# Patient Record
Sex: FEMALE | Race: WHITE
[De-identification: names, ages, dates, MRNs, and addresses within clinical notes are randomized per-mention and may not be internally consistent; named-entity substitution may affect disease eponyms.]

---

## 2018-02-09 ENCOUNTER — HOSPITAL ENCOUNTER (INPATIENT)
Dept: HOSPITAL 91 - ICU | Age: 83
Discharge: LEFT BEFORE DISCHARGE | DRG: 69 | End: 2018-02-09
Payer: MEDICARE

## 2018-02-09 ENCOUNTER — HOSPITAL ENCOUNTER (INPATIENT)
Age: 83
Discharge: LEFT BEFORE DISCHARGE | DRG: 69 | End: 2018-02-09

## 2018-02-09 DIAGNOSIS — Z86.73: ICD-10-CM

## 2018-02-09 DIAGNOSIS — R47.01: ICD-10-CM

## 2018-02-09 DIAGNOSIS — D64.9: ICD-10-CM

## 2018-02-09 DIAGNOSIS — G45.9: Primary | ICD-10-CM

## 2018-02-09 DIAGNOSIS — R29.810: ICD-10-CM

## 2018-02-09 DIAGNOSIS — I16.0: ICD-10-CM

## 2018-02-09 DIAGNOSIS — E11.649: ICD-10-CM

## 2018-02-09 LAB
ADD MAN DIFF?: NO
ADD UMIC: YES
ALANINE AMINOTRANSFERASE: 34 IU/L (ref 13–69)
ALBUMIN/GLOBULIN RATIO: 1.34
ALBUMIN: 4.3 G/DL (ref 3.3–4.9)
ALKALINE PHOSPHATASE: 63 IU/L (ref 42–121)
ANION GAP: 17 (ref 8–16)
ASPARTATE AMINO TRANSFERASE: 31 IU/L (ref 15–46)
BASOPHIL #: 0.1 10^3/UL (ref 0–0.1)
BASOPHILS %: 0.7 % (ref 0–2)
BILIRUBIN,DIRECT: 0 MG/DL (ref 0–0.2)
BILIRUBIN,TOTAL: 0 MG/DL (ref 0.2–1.3)
BLOOD UREA NITROGEN: 19 MG/DL (ref 7–20)
CALCIUM: 9 MG/DL (ref 8.4–10.2)
CARBON DIOXIDE: 26 MMOL/L (ref 21–31)
CHLORIDE: 106 MMOL/L (ref 97–110)
CREATININE: 1.08 MG/DL (ref 0.44–1)
EOSINOPHILS #: 0.1 10^3/UL (ref 0–0.5)
EOSINOPHILS %: 1 % (ref 0–7)
GLOBULIN: 3.2 G/DL (ref 1.3–3.2)
GLUCOSE: 168 MG/DL (ref 70–220)
HEMATOCRIT: 35.2 % (ref 37–47)
HEMOGLOBIN: 10.7 G/DL (ref 12–16)
INR: 0.93
LACTIC ACID: 1.8 MMOL/L (ref 0.5–2)
LACTIC ACID: 2.9 MMOL/L (ref 0.5–2)
LYMPHOCYTES #: 1.6 10^3/UL (ref 0.8–2.9)
LYMPHOCYTES %: 23.6 % (ref 15–51)
MEAN CORPUSCULAR HEMOGLOBIN: 26.8 PG (ref 29–33)
MEAN CORPUSCULAR HGB CONC: 30.4 G/DL (ref 32–37)
MEAN CORPUSCULAR VOLUME: 88.2 FL (ref 82–101)
MEAN PLATELET VOLUME: 10.5 FL (ref 7.4–10.4)
MONOCYTE #: 0.5 10^3/UL (ref 0.3–0.9)
MONOCYTES %: 6.7 % (ref 0–11)
NEUTROPHIL #: 4.6 10^3/UL (ref 1.6–7.5)
NEUTROPHILS %: 67.6 % (ref 39–77)
NUCLEATED RED BLOOD CELLS #: 0 10^3/UL (ref 0–0)
NUCLEATED RED BLOOD CELLS%: 0 /100WBC (ref 0–0)
PARTIAL THROMBOPLASTIN TIME: 26.9 SEC (ref 25–35)
PLATELET COUNT: 153 10^3/UL (ref 140–415)
POTASSIUM: 4.2 MMOL/L (ref 3.5–5.1)
PROTIME: 12.5 SEC (ref 11.9–14.9)
PT RATIO: 1
RED BLOOD COUNT: 3.99 10^6/UL (ref 4.2–5.4)
RED CELL DISTRIBUTION WIDTH: 14.5 % (ref 11.5–14.5)
SODIUM: 145 MMOL/L (ref 135–144)
TOTAL PROTEIN: 7.5 G/DL (ref 6.1–8.1)
TROPONIN-I: 0.01 NG/ML (ref 0–0.12)
UR AMORPHOUS CRYSTAL: (no result) /HPF
UR ASCORBIC ACID: NEGATIVE MG/DL
UR BACTERIA: (no result) /HPF
UR BILIRUBIN (DIP): NEGATIVE MG/DL
UR BLOOD (DIP): NEGATIVE MG/DL
UR CLARITY: CLEAR
UR COLOR: YELLOW
UR GLUCOSE (DIP): (no result) MG/DL
UR KETONES (DIP): NEGATIVE MG/DL
UR LEUKOCYTE ESTERASE (DIP): NEGATIVE LEU/UL
UR NITRITE (DIP): NEGATIVE MG/DL
UR PH (DIP): 5 (ref 5–9)
UR RBC: 0 /HPF (ref 0–5)
UR SPECIFIC GRAVITY (DIP): 1.01 (ref 1–1.03)
UR TOTAL PROTEIN (DIP): (no result) MG/DL
UR UROBILINOGEN (DIP): NEGATIVE MG/DL
UR WBC: 1 /HPF (ref 0–5)
URINE PH (DIP) POC: 5.5 (ref 5–8.5)
URINE TOTAL PROTEIN POC: (no result)
WHITE BLOOD COUNT: 6.8 10^3/UL (ref 4.8–10.8)

## 2018-02-09 PROCEDURE — 80053 COMPREHEN METABOLIC PANEL: CPT

## 2018-02-09 PROCEDURE — 93306 TTE W/DOPPLER COMPLETE: CPT

## 2018-02-09 PROCEDURE — 96374 THER/PROPH/DIAG INJ IV PUSH: CPT

## 2018-02-09 PROCEDURE — 84484 ASSAY OF TROPONIN QUANT: CPT

## 2018-02-09 PROCEDURE — 93005 ELECTROCARDIOGRAM TRACING: CPT

## 2018-02-09 PROCEDURE — 99285 EMERGENCY DEPT VISIT HI MDM: CPT

## 2018-02-09 PROCEDURE — 85610 PROTHROMBIN TIME: CPT

## 2018-02-09 PROCEDURE — 81001 URINALYSIS AUTO W/SCOPE: CPT

## 2018-02-09 PROCEDURE — 87086 URINE CULTURE/COLONY COUNT: CPT

## 2018-02-09 PROCEDURE — 83605 ASSAY OF LACTIC ACID: CPT

## 2018-02-09 PROCEDURE — 85025 COMPLETE CBC W/AUTO DIFF WBC: CPT

## 2018-02-09 PROCEDURE — 81003 URINALYSIS AUTO W/O SCOPE: CPT

## 2018-02-09 PROCEDURE — 87040 BLOOD CULTURE FOR BACTERIA: CPT

## 2018-02-09 PROCEDURE — 36415 COLL VENOUS BLD VENIPUNCTURE: CPT

## 2018-02-09 PROCEDURE — 85730 THROMBOPLASTIN TIME PARTIAL: CPT

## 2018-02-09 PROCEDURE — 96375 TX/PRO/DX INJ NEW DRUG ADDON: CPT

## 2018-02-09 PROCEDURE — 70450 CT HEAD/BRAIN W/O DYE: CPT

## 2018-02-09 PROCEDURE — 71045 X-RAY EXAM CHEST 1 VIEW: CPT

## 2018-02-09 PROCEDURE — 82962 GLUCOSE BLOOD TEST: CPT

## 2018-02-09 RX ADMIN — LABETALOL HYDROCHLORIDE 1 MG: 5 INJECTION, SOLUTION INTRAVENOUS at 13:56

## 2018-02-09 RX ADMIN — ASPIRIN 1 MG: 300 SUPPOSITORY RECTAL at 06:10

## 2018-02-09 RX ADMIN — ONDANSETRON HYDROCHLORIDE 1 MG: 2 INJECTION, SOLUTION INTRAMUSCULAR; INTRAVENOUS at 08:12

## 2018-02-09 RX ADMIN — LORAZEPAM 1 MG: 2 INJECTION, SOLUTION INTRAMUSCULAR; INTRAVENOUS at 13:55

## 2018-02-09 RX ADMIN — INSULIN ASPART 1 UNIT: 100 INJECTION, SOLUTION INTRAVENOUS; SUBCUTANEOUS at 13:00

## 2018-02-09 RX ADMIN — INSULIN ASPART 1 UNIT: 100 INJECTION, SOLUTION INTRAVENOUS; SUBCUTANEOUS at 09:00

## 2018-02-09 RX ADMIN — PANTOPRAZOLE SODIUM 1 MG: 40 INJECTION, POWDER, FOR SOLUTION INTRAVENOUS at 07:11

## 2018-02-09 RX ADMIN — THIAMINE HYDROCHLORIDE 1 MLS/HR: 100 INJECTION, SOLUTION INTRAMUSCULAR; INTRAVENOUS at 07:11

## 2018-02-09 RX ADMIN — DEXTROSE MONOHYDRATE 1 ML: 500 INJECTION PARENTERAL at 05:15

## 2018-02-09 RX ADMIN — ASCORBIC ACID, VITAMIN A PALMITATE, CHOLECALCIFEROL, THIAMINE HYDROCHLORIDE, RIBOFLAVIN-5 PHOSPHATE SODIUM, PYRIDOXINE HYDROCHLORIDE, NIACINAMIDE, DEXPANTHENOL, ALPHA-TOCOPHEROL ACETATE, VITAMIN K1, FOLIC ACID, BIOTIN, CYANOCOBALAMIN 1 MLS/HR: 200; 3300; 200; 6; 3.6; 6; 40; 15; 10; 150; 600; 60; 5 INJECTION, SOLUTION INTRAVENOUS at 14:05

## 2018-02-09 RX ADMIN — NICARDIPINE HYDROCHLORIDE 1 MLS/HR: 0.1 INJECTION, SOLUTION INTRAVENOUS at 04:57

## 2018-06-20 ENCOUNTER — HOSPITAL ENCOUNTER (EMERGENCY)
Age: 83
Discharge: LEFT BEFORE DISCHARGE | End: 2018-06-20

## 2018-06-20 ENCOUNTER — HOSPITAL ENCOUNTER (EMERGENCY)
Dept: HOSPITAL 91 - E/R | Age: 83
Discharge: LEFT BEFORE DISCHARGE | End: 2018-06-20
Payer: MEDICARE

## 2018-06-20 DIAGNOSIS — I16.1: Primary | ICD-10-CM

## 2018-06-20 DIAGNOSIS — G45.9: ICD-10-CM

## 2018-06-20 DIAGNOSIS — I10: ICD-10-CM

## 2018-06-20 DIAGNOSIS — E11.9: ICD-10-CM

## 2018-06-20 LAB
ADD MAN DIFF?: NO
ADD UMIC: YES
ALANINE AMINOTRANSFERASE: 28 IU/L (ref 13–69)
ALBUMIN/GLOBULIN RATIO: 1.16
ALBUMIN: 4.2 G/DL (ref 3.3–4.9)
ALKALINE PHOSPHATASE: 63 IU/L (ref 42–121)
AMPHETAMINE/METHAMPHETAMINE: NEGATIVE
ANION GAP: 13 (ref 8–16)
ASPARTATE AMINO TRANSFERASE: 29 IU/L (ref 15–46)
BARBITURATES: NEGATIVE
BASOPHIL #: 0.1 10^3/UL (ref 0–0.1)
BASOPHILS %: 1 % (ref 0–2)
BENZODIAZEPINES: NEGATIVE
BILIRUBIN,DIRECT: 0 MG/DL (ref 0–0.2)
BILIRUBIN,TOTAL: 0.2 MG/DL (ref 0.2–1.3)
BLOOD UREA NITROGEN: 21 MG/DL (ref 7–20)
CALCIUM: 9.2 MG/DL (ref 8.4–10.2)
CANNABINOIDS: NEGATIVE
CARBON DIOXIDE: 24 MMOL/L (ref 21–31)
CHLORIDE: 111 MMOL/L (ref 97–110)
CHOL/HDL RATIO: 3.9 RATIO
CHOLESTEROL: 158 MG/DL (ref 100–200)
COCAINE: NEGATIVE
CREATININE: 1.02 MG/DL (ref 0.44–1)
EOSINOPHILS #: 0.1 10^3/UL (ref 0–0.5)
EOSINOPHILS %: 1.9 % (ref 0–7)
GLOBULIN: 3.6 G/DL (ref 1.3–3.2)
GLUCOSE: 71 MG/DL (ref 70–220)
HDL CHOLESTEROL: 40 MG/DL (ref 33–92)
HEMATOCRIT: 36.5 % (ref 37–47)
HEMOGLOBIN A1C: 6.5 % (ref 0–5.9)
HEMOGLOBIN: 11 G/DL (ref 12–16)
INR: 0.93
LDL CHOLESTEROL,CALCULATED: 65 MG/DL
LYMPHOCYTES #: 1.2 10^3/UL (ref 0.8–2.9)
LYMPHOCYTES %: 19.6 % (ref 15–51)
MEAN CORPUSCULAR HEMOGLOBIN: 26.1 PG (ref 29–33)
MEAN CORPUSCULAR HGB CONC: 30.1 G/DL (ref 32–37)
MEAN CORPUSCULAR VOLUME: 86.7 FL (ref 82–101)
MEAN PLATELET VOLUME: 10 FL (ref 7.4–10.4)
MONOCYTE #: 0.4 10^3/UL (ref 0.3–0.9)
MONOCYTES %: 6.8 % (ref 0–11)
NEUTROPHIL #: 4.1 10^3/UL (ref 1.6–7.5)
NEUTROPHILS %: 70.2 % (ref 39–77)
NUCLEATED RED BLOOD CELLS #: 0 10^3/UL (ref 0–0)
NUCLEATED RED BLOOD CELLS%: 0 /100WBC (ref 0–0)
OPIATES: POSITIVE
PARTIAL THROMBOPLASTIN TIME: 24.7 SEC (ref 25–35)
PLATELET COUNT: 163 10^3/UL (ref 140–415)
POTASSIUM: 4.8 MMOL/L (ref 3.5–5.1)
PROTIME: 12.5 SEC (ref 11.9–14.9)
PT RATIO: 1
RED BLOOD COUNT: 4.21 10^6/UL (ref 4.2–5.4)
RED CELL DISTRIBUTION WIDTH: 14.6 % (ref 11.5–14.5)
SODIUM: 143 MMOL/L (ref 135–144)
TOTAL PROTEIN: 7.8 G/DL (ref 6.1–8.1)
TRIGLYCERIDES: 266 MG/DL (ref 0–149)
TROPONIN-I: 0.02 NG/ML (ref 0–0.12)
UR ASCORBIC ACID: NEGATIVE MG/DL
UR BILIRUBIN (DIP): NEGATIVE MG/DL
UR BLOOD (DIP): NEGATIVE MG/DL
UR CLARITY: CLEAR
UR COLOR: (no result)
UR GLUCOSE (DIP): NEGATIVE MG/DL
UR KETONES (DIP): NEGATIVE MG/DL
UR LEUKOCYTE ESTERASE (DIP): NEGATIVE LEU/UL
UR NITRITE (DIP): NEGATIVE MG/DL
UR PH (DIP): 5 (ref 5–9)
UR RBC: 1 /HPF (ref 0–5)
UR SPECIFIC GRAVITY (DIP): 1.01 (ref 1–1.03)
UR TOTAL PROTEIN (DIP): (no result) MG/DL
UR UROBILINOGEN (DIP): NEGATIVE MG/DL
UR WBC: 0 /HPF (ref 0–5)
WHITE BLOOD COUNT: 5.9 10^3/UL (ref 4.8–10.8)

## 2018-06-20 PROCEDURE — 36415 COLL VENOUS BLD VENIPUNCTURE: CPT

## 2018-06-20 PROCEDURE — 82962 GLUCOSE BLOOD TEST: CPT

## 2018-06-20 PROCEDURE — 83036 HEMOGLOBIN GLYCOSYLATED A1C: CPT

## 2018-06-20 PROCEDURE — 92610 EVALUATE SWALLOWING FUNCTION: CPT

## 2018-06-20 PROCEDURE — 80307 DRUG TEST PRSMV CHEM ANLYZR: CPT

## 2018-06-20 PROCEDURE — 70498 CT ANGIOGRAPHY NECK: CPT

## 2018-06-20 PROCEDURE — 80053 COMPREHEN METABOLIC PANEL: CPT

## 2018-06-20 PROCEDURE — 71045 X-RAY EXAM CHEST 1 VIEW: CPT

## 2018-06-20 PROCEDURE — 84484 ASSAY OF TROPONIN QUANT: CPT

## 2018-06-20 PROCEDURE — 85610 PROTHROMBIN TIME: CPT

## 2018-06-20 PROCEDURE — 85025 COMPLETE CBC W/AUTO DIFF WBC: CPT

## 2018-06-20 PROCEDURE — 70496 CT ANGIOGRAPHY HEAD: CPT

## 2018-06-20 PROCEDURE — 99291 CRITICAL CARE FIRST HOUR: CPT

## 2018-06-20 PROCEDURE — 85730 THROMBOPLASTIN TIME PARTIAL: CPT

## 2018-06-20 PROCEDURE — 96374 THER/PROPH/DIAG INJ IV PUSH: CPT

## 2018-06-20 PROCEDURE — 93005 ELECTROCARDIOGRAM TRACING: CPT

## 2018-06-20 PROCEDURE — 81001 URINALYSIS AUTO W/SCOPE: CPT

## 2018-06-20 PROCEDURE — 70450 CT HEAD/BRAIN W/O DYE: CPT

## 2018-06-20 PROCEDURE — 80061 LIPID PANEL: CPT

## 2018-06-20 RX ADMIN — LABETALOL HYDROCHLORIDE 1 MG: 5 INJECTION, SOLUTION INTRAVENOUS at 07:25

## 2018-06-20 RX ADMIN — IOHEXOL 1 ML: 300 INJECTION, SOLUTION INTRAVENOUS at 06:21

## 2018-06-20 RX ADMIN — ONDANSETRON HYDROCHLORIDE 1 MG: 2 INJECTION, SOLUTION INTRAMUSCULAR; INTRAVENOUS at 07:00

## 2018-06-20 RX ADMIN — VASOPRESSIN 1 ML/SEC: 20 INJECTION, SOLUTION INTRAMUSCULAR; SUBCUTANEOUS at 06:21

## 2018-06-20 RX ADMIN — LABETALOL HYDROCHLORIDE 1 MG: 5 INJECTION, SOLUTION INTRAVENOUS at 07:21

## 2018-07-16 ENCOUNTER — HOSPITAL ENCOUNTER (EMERGENCY)
Dept: HOSPITAL 54 - ER | Age: 83
Discharge: HOME | End: 2018-07-16
Payer: MEDICARE

## 2018-07-16 VITALS — DIASTOLIC BLOOD PRESSURE: 73 MMHG | SYSTOLIC BLOOD PRESSURE: 197 MMHG

## 2018-07-16 VITALS — HEIGHT: 64 IN | BODY MASS INDEX: 23.05 KG/M2 | WEIGHT: 135 LBS

## 2018-07-16 DIAGNOSIS — R40.4: ICD-10-CM

## 2018-07-16 DIAGNOSIS — E11.9: ICD-10-CM
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
## 2018-07-16 DIAGNOSIS — Z90.49: ICD-10-CM

## 2018-07-16 DIAGNOSIS — I10: ICD-10-CM

## 2018-07-16 DIAGNOSIS — Z98.890: ICD-10-CM

## 2018-07-16 DIAGNOSIS — S01.111A: Primary | ICD-10-CM
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
## 2018-07-16 PROCEDURE — A6402 STERILE GAUZE <= 16 SQ IN: HCPCS

## 2018-07-16 PROCEDURE — 70450 CT HEAD/BRAIN W/O DYE: CPT

## 2018-07-16 PROCEDURE — 12011 RPR F/E/E/N/L/M 2.5 CM/<: CPT

## 2018-07-16 PROCEDURE — 99284 EMERGENCY DEPT VISIT MOD MDM: CPT

## 2018-07-16 PROCEDURE — A4606 OXYGEN PROBE USED W OXIMETER: HCPCS

## 2018-07-16 PROCEDURE — Z7610: HCPCS

## 2018-07-16 NOTE — NUR
Patient discharged to home in stable condition. Written and verbal after care 
instructions given. Patient's granddaughter verbalizes understanding of 
instruction. pt left via wc to the granddaughter's car. vss.